# Patient Record
Sex: MALE | Race: OTHER | HISPANIC OR LATINO
[De-identification: names, ages, dates, MRNs, and addresses within clinical notes are randomized per-mention and may not be internally consistent; named-entity substitution may affect disease eponyms.]

---

## 2017-05-04 PROBLEM — Z87.898 HISTORY OF SYNCOPE: Status: RESOLVED | Noted: 2017-05-04 | Resolved: 2017-05-04

## 2017-05-06 ENCOUNTER — FORM ENCOUNTER (OUTPATIENT)
Age: 22
End: 2017-05-06

## 2017-05-07 ENCOUNTER — OUTPATIENT (OUTPATIENT)
Dept: OUTPATIENT SERVICES | Facility: HOSPITAL | Age: 22
LOS: 1 days | End: 2017-05-07
Payer: COMMERCIAL

## 2017-05-07 PROCEDURE — 70553 MRI BRAIN STEM W/O & W/DYE: CPT

## 2017-05-07 PROCEDURE — A9585: CPT

## 2017-05-07 PROCEDURE — 70553 MRI BRAIN STEM W/O & W/DYE: CPT | Mod: 26

## 2017-05-08 ENCOUNTER — APPOINTMENT (OUTPATIENT)
Dept: NEUROSURGERY | Facility: CLINIC | Age: 22
End: 2017-05-08

## 2017-05-08 VITALS
OXYGEN SATURATION: 98 % | DIASTOLIC BLOOD PRESSURE: 58 MMHG | HEART RATE: 74 BPM | WEIGHT: 172.31 LBS | HEIGHT: 71 IN | SYSTOLIC BLOOD PRESSURE: 128 MMHG | BODY MASS INDEX: 24.12 KG/M2

## 2017-05-08 DIAGNOSIS — Z78.9 OTHER SPECIFIED HEALTH STATUS: ICD-10-CM

## 2017-05-08 DIAGNOSIS — Z87.898 PERSONAL HISTORY OF OTHER SPECIFIED CONDITIONS: ICD-10-CM

## 2017-05-08 DIAGNOSIS — Z80.9 FAMILY HISTORY OF MALIGNANT NEOPLASM, UNSPECIFIED: ICD-10-CM

## 2017-05-10 ENCOUNTER — APPOINTMENT (OUTPATIENT)
Dept: OTOLARYNGOLOGY | Facility: CLINIC | Age: 22
End: 2017-05-10

## 2017-05-10 VITALS
DIASTOLIC BLOOD PRESSURE: 75 MMHG | SYSTOLIC BLOOD PRESSURE: 132 MMHG | TEMPERATURE: 98.2 F | HEART RATE: 68 BPM | OXYGEN SATURATION: 100 %

## 2017-05-10 PROBLEM — Z80.9 FAMILY HISTORY OF MALIGNANT NEOPLASM: Status: ACTIVE | Noted: 2017-05-08

## 2017-05-10 PROBLEM — Z78.9 DOES NOT USE ILLICIT DRUGS: Status: ACTIVE | Noted: 2017-05-08

## 2017-05-12 ENCOUNTER — APPOINTMENT (OUTPATIENT)
Dept: OTOLARYNGOLOGY | Facility: CLINIC | Age: 22
End: 2017-05-12

## 2017-05-12 ENCOUNTER — APPOINTMENT (OUTPATIENT)
Dept: OPHTHALMOLOGY | Facility: CLINIC | Age: 22
End: 2017-05-12

## 2017-05-12 DIAGNOSIS — R42 DIZZINESS AND GIDDINESS: ICD-10-CM

## 2017-05-18 ENCOUNTER — APPOINTMENT (OUTPATIENT)
Dept: OTOLARYNGOLOGY | Facility: CLINIC | Age: 22
End: 2017-05-18

## 2017-05-18 VITALS
DIASTOLIC BLOOD PRESSURE: 73 MMHG | HEART RATE: 99 BPM | SYSTOLIC BLOOD PRESSURE: 128 MMHG | TEMPERATURE: 97.5 F | OXYGEN SATURATION: 97 %

## 2017-12-15 ENCOUNTER — APPOINTMENT (OUTPATIENT)
Dept: OTOLARYNGOLOGY | Facility: CLINIC | Age: 22
End: 2017-12-15
Payer: COMMERCIAL

## 2017-12-15 VITALS
OXYGEN SATURATION: 98 % | HEART RATE: 75 BPM | TEMPERATURE: 97.6 F | DIASTOLIC BLOOD PRESSURE: 49 MMHG | SYSTOLIC BLOOD PRESSURE: 106 MMHG

## 2017-12-15 DIAGNOSIS — G93.0 CEREBRAL CYSTS: ICD-10-CM

## 2017-12-15 PROCEDURE — 99214 OFFICE O/P EST MOD 30 MIN: CPT

## 2017-12-15 PROCEDURE — 92557 COMPREHENSIVE HEARING TEST: CPT

## 2017-12-15 PROCEDURE — 92550 TYMPANOMETRY & REFLEX THRESH: CPT

## 2018-02-09 ENCOUNTER — APPOINTMENT (OUTPATIENT)
Dept: OTOLARYNGOLOGY | Facility: CLINIC | Age: 23
End: 2018-02-09
Payer: COMMERCIAL

## 2018-02-09 VITALS
OXYGEN SATURATION: 96 % | SYSTOLIC BLOOD PRESSURE: 124 MMHG | TEMPERATURE: 97.9 F | HEART RATE: 78 BPM | DIASTOLIC BLOOD PRESSURE: 69 MMHG

## 2018-02-09 PROCEDURE — 99024 POSTOP FOLLOW-UP VISIT: CPT

## 2018-10-23 ENCOUNTER — APPOINTMENT (OUTPATIENT)
Dept: OTOLARYNGOLOGY | Facility: CLINIC | Age: 23
End: 2018-10-23
Payer: COMMERCIAL

## 2018-10-23 VITALS
SYSTOLIC BLOOD PRESSURE: 124 MMHG | HEART RATE: 75 BPM | OXYGEN SATURATION: 99 % | TEMPERATURE: 98.3 F | DIASTOLIC BLOOD PRESSURE: 75 MMHG

## 2018-10-23 PROCEDURE — 69210 REMOVE IMPACTED EAR WAX UNI: CPT

## 2018-10-23 PROCEDURE — 99213 OFFICE O/P EST LOW 20 MIN: CPT | Mod: 25

## 2018-11-01 ENCOUNTER — APPOINTMENT (OUTPATIENT)
Dept: DERMATOLOGY | Facility: CLINIC | Age: 23
End: 2018-11-01

## 2018-11-08 ENCOUNTER — APPOINTMENT (OUTPATIENT)
Dept: DERMATOLOGY | Facility: CLINIC | Age: 23
End: 2018-11-08
Payer: COMMERCIAL

## 2018-11-08 VITALS — DIASTOLIC BLOOD PRESSURE: 77 MMHG | SYSTOLIC BLOOD PRESSURE: 115 MMHG

## 2018-11-08 DIAGNOSIS — R53.83 OTHER FATIGUE: ICD-10-CM

## 2018-11-08 DIAGNOSIS — L80 VITILIGO: ICD-10-CM

## 2018-11-08 PROCEDURE — 99203 OFFICE O/P NEW LOW 30 MIN: CPT

## 2018-11-08 PROCEDURE — 36415 COLL VENOUS BLD VENIPUNCTURE: CPT

## 2018-11-11 LAB
THYROGLOB AB SERPL-ACNC: <20 IU/ML
THYROPEROXIDASE AB SERPL IA-ACNC: 20 IU/ML
TSH SERPL-ACNC: 1.43 UIU/ML

## 2018-11-15 ENCOUNTER — APPOINTMENT (OUTPATIENT)
Dept: OTOLARYNGOLOGY | Facility: CLINIC | Age: 23
End: 2018-11-15

## 2018-12-06 ENCOUNTER — APPOINTMENT (OUTPATIENT)
Dept: DERMATOLOGY | Facility: CLINIC | Age: 23
End: 2018-12-06

## 2018-12-06 ENCOUNTER — APPOINTMENT (OUTPATIENT)
Dept: OTOLARYNGOLOGY | Facility: CLINIC | Age: 23
End: 2018-12-06

## 2019-02-07 ENCOUNTER — APPOINTMENT (OUTPATIENT)
Dept: DERMATOLOGY | Facility: CLINIC | Age: 24
End: 2019-02-07

## 2019-02-14 ENCOUNTER — APPOINTMENT (OUTPATIENT)
Dept: OTOLARYNGOLOGY | Facility: CLINIC | Age: 24
End: 2019-02-14

## 2019-03-21 ENCOUNTER — APPOINTMENT (OUTPATIENT)
Dept: DERMATOLOGY | Facility: CLINIC | Age: 24
End: 2019-03-21

## 2019-05-09 ENCOUNTER — APPOINTMENT (OUTPATIENT)
Dept: DERMATOLOGY | Facility: CLINIC | Age: 24
End: 2019-05-09

## 2019-11-06 ENCOUNTER — EMERGENCY (EMERGENCY)
Facility: HOSPITAL | Age: 24
LOS: 1 days | Discharge: HOME | End: 2019-11-06
Attending: EMERGENCY MEDICINE
Payer: COMMERCIAL

## 2019-11-06 VITALS
WEIGHT: 175.93 LBS | SYSTOLIC BLOOD PRESSURE: 137 MMHG | TEMPERATURE: 99 F | OXYGEN SATURATION: 97 % | RESPIRATION RATE: 16 BRPM | DIASTOLIC BLOOD PRESSURE: 71 MMHG | HEART RATE: 123 BPM | HEIGHT: 71 IN

## 2019-11-06 LAB
ALBUMIN SERPL ELPH-MCNC: 4.3 G/DL — SIGNIFICANT CHANGE UP (ref 3.5–5)
ALP SERPL-CCNC: 99 U/L — SIGNIFICANT CHANGE UP (ref 40–120)
ALT FLD-CCNC: 21 U/L DA — SIGNIFICANT CHANGE UP (ref 10–60)
ANION GAP SERPL CALC-SCNC: 8 MMOL/L — SIGNIFICANT CHANGE UP (ref 5–17)
APTT BLD: 32.8 SEC — SIGNIFICANT CHANGE UP (ref 27.5–36.3)
AST SERPL-CCNC: 13 U/L — SIGNIFICANT CHANGE UP (ref 10–40)
BASOPHILS # BLD AUTO: 0.06 K/UL — SIGNIFICANT CHANGE UP (ref 0–0.2)
BASOPHILS NFR BLD AUTO: 0.6 % — SIGNIFICANT CHANGE UP (ref 0–2)
BILIRUB SERPL-MCNC: 0.8 MG/DL — SIGNIFICANT CHANGE UP (ref 0.2–1.2)
BUN SERPL-MCNC: 18 MG/DL — SIGNIFICANT CHANGE UP (ref 7–18)
CALCIUM SERPL-MCNC: 8.6 MG/DL — SIGNIFICANT CHANGE UP (ref 8.4–10.5)
CHLORIDE SERPL-SCNC: 107 MMOL/L — SIGNIFICANT CHANGE UP (ref 96–108)
CO2 SERPL-SCNC: 24 MMOL/L — SIGNIFICANT CHANGE UP (ref 22–31)
CREAT SERPL-MCNC: 1.09 MG/DL — SIGNIFICANT CHANGE UP (ref 0.5–1.3)
EOSINOPHIL # BLD AUTO: 0.07 K/UL — SIGNIFICANT CHANGE UP (ref 0–0.5)
EOSINOPHIL NFR BLD AUTO: 0.7 % — SIGNIFICANT CHANGE UP (ref 0–6)
GLUCOSE SERPL-MCNC: 98 MG/DL — SIGNIFICANT CHANGE UP (ref 70–99)
HCG SERPL-ACNC: <1 MIU/ML — HIGH
HCT VFR BLD CALC: 41.4 % — SIGNIFICANT CHANGE UP (ref 39–50)
HGB BLD-MCNC: 14.2 G/DL — SIGNIFICANT CHANGE UP (ref 13–17)
IMM GRANULOCYTES NFR BLD AUTO: 0.3 % — SIGNIFICANT CHANGE UP (ref 0–1.5)
INR BLD: 1.18 RATIO — SIGNIFICANT CHANGE UP (ref 0.88–1.16)
LACTATE SERPL-SCNC: 1 MMOL/L — SIGNIFICANT CHANGE UP (ref 0.7–2)
LYMPHOCYTES # BLD AUTO: 1.59 K/UL — SIGNIFICANT CHANGE UP (ref 1–3.3)
LYMPHOCYTES # BLD AUTO: 14.8 % — SIGNIFICANT CHANGE UP (ref 13–44)
MCHC RBC-ENTMCNC: 30.7 PG — SIGNIFICANT CHANGE UP (ref 27–34)
MCHC RBC-ENTMCNC: 34.3 GM/DL — SIGNIFICANT CHANGE UP (ref 32–36)
MCV RBC AUTO: 89.6 FL — SIGNIFICANT CHANGE UP (ref 80–100)
MONOCYTES # BLD AUTO: 1.04 K/UL — HIGH (ref 0–0.9)
MONOCYTES NFR BLD AUTO: 9.7 % — SIGNIFICANT CHANGE UP (ref 2–14)
NEUTROPHILS # BLD AUTO: 7.92 K/UL — HIGH (ref 1.8–7.4)
NEUTROPHILS NFR BLD AUTO: 73.9 % — SIGNIFICANT CHANGE UP (ref 43–77)
NRBC # BLD: 0 /100 WBCS — SIGNIFICANT CHANGE UP (ref 0–0)
PLATELET # BLD AUTO: 296 K/UL — SIGNIFICANT CHANGE UP (ref 150–400)
POTASSIUM SERPL-MCNC: 4 MMOL/L — SIGNIFICANT CHANGE UP (ref 3.5–5.3)
POTASSIUM SERPL-SCNC: 4 MMOL/L — SIGNIFICANT CHANGE UP (ref 3.5–5.3)
PROT SERPL-MCNC: 7.2 G/DL — SIGNIFICANT CHANGE UP (ref 6–8.3)
PROTHROM AB SERPL-ACNC: 13.2 SEC — SIGNIFICANT CHANGE UP (ref 10–12.9)
RBC # BLD: 4.62 M/UL — SIGNIFICANT CHANGE UP (ref 4.2–5.8)
RBC # FLD: 12.9 % — SIGNIFICANT CHANGE UP (ref 10.3–14.5)
SODIUM SERPL-SCNC: 139 MMOL/L — SIGNIFICANT CHANGE UP (ref 135–145)
WBC # BLD: 10.71 K/UL — HIGH (ref 3.8–10.5)
WBC # FLD AUTO: 10.71 K/UL — HIGH (ref 3.8–10.5)

## 2019-11-06 PROCEDURE — 99285 EMERGENCY DEPT VISIT HI MDM: CPT

## 2019-11-06 RX ORDER — IOHEXOL 300 MG/ML
30 INJECTION, SOLUTION INTRAVENOUS ONCE
Refills: 0 | Status: COMPLETED | OUTPATIENT
Start: 2019-11-06 | End: 2019-11-07

## 2019-11-06 RX ORDER — ACETAMINOPHEN 500 MG
1000 TABLET ORAL ONCE
Refills: 0 | Status: COMPLETED | OUTPATIENT
Start: 2019-11-06 | End: 2019-11-06

## 2019-11-06 RX ORDER — SODIUM CHLORIDE 9 MG/ML
1000 INJECTION INTRAMUSCULAR; INTRAVENOUS; SUBCUTANEOUS ONCE
Refills: 0 | Status: COMPLETED | OUTPATIENT
Start: 2019-11-06 | End: 2019-11-06

## 2019-11-06 RX ORDER — MORPHINE SULFATE 50 MG/1
4 CAPSULE, EXTENDED RELEASE ORAL ONCE
Refills: 0 | Status: DISCONTINUED | OUTPATIENT
Start: 2019-11-06 | End: 2019-11-06

## 2019-11-06 RX ADMIN — Medication 400 MILLIGRAM(S): at 23:21

## 2019-11-06 RX ADMIN — Medication 1000 MILLIGRAM(S): at 23:48

## 2019-11-06 RX ADMIN — SODIUM CHLORIDE 1000 MILLILITER(S): 9 INJECTION INTRAMUSCULAR; INTRAVENOUS; SUBCUTANEOUS at 23:21

## 2019-11-07 LAB
APPEARANCE UR: CLEAR — SIGNIFICANT CHANGE UP
BILIRUB UR-MCNC: NEGATIVE — SIGNIFICANT CHANGE UP
COLOR SPEC: YELLOW — SIGNIFICANT CHANGE UP
DIFF PNL FLD: ABNORMAL
GLUCOSE UR QL: NEGATIVE — SIGNIFICANT CHANGE UP
KETONES UR-MCNC: ABNORMAL
LEUKOCYTE ESTERASE UR-ACNC: NEGATIVE — SIGNIFICANT CHANGE UP
NITRITE UR-MCNC: NEGATIVE — SIGNIFICANT CHANGE UP
PH UR: 5 — SIGNIFICANT CHANGE UP (ref 5–8)
PROT UR-MCNC: NEGATIVE — SIGNIFICANT CHANGE UP
SP GR SPEC: 1.01 — SIGNIFICANT CHANGE UP (ref 1.01–1.02)
UROBILINOGEN FLD QL: NEGATIVE — SIGNIFICANT CHANGE UP

## 2019-11-07 PROCEDURE — 83605 ASSAY OF LACTIC ACID: CPT

## 2019-11-07 PROCEDURE — 74177 CT ABD & PELVIS W/CONTRAST: CPT | Mod: 26

## 2019-11-07 PROCEDURE — 85027 COMPLETE CBC AUTOMATED: CPT

## 2019-11-07 PROCEDURE — 87086 URINE CULTURE/COLONY COUNT: CPT

## 2019-11-07 PROCEDURE — 36415 COLL VENOUS BLD VENIPUNCTURE: CPT

## 2019-11-07 PROCEDURE — 99284 EMERGENCY DEPT VISIT MOD MDM: CPT | Mod: 25

## 2019-11-07 PROCEDURE — 85610 PROTHROMBIN TIME: CPT

## 2019-11-07 PROCEDURE — 96375 TX/PRO/DX INJ NEW DRUG ADDON: CPT

## 2019-11-07 PROCEDURE — 81001 URINALYSIS AUTO W/SCOPE: CPT

## 2019-11-07 PROCEDURE — 86900 BLOOD TYPING SEROLOGIC ABO: CPT

## 2019-11-07 PROCEDURE — 86901 BLOOD TYPING SEROLOGIC RH(D): CPT

## 2019-11-07 PROCEDURE — 85730 THROMBOPLASTIN TIME PARTIAL: CPT

## 2019-11-07 PROCEDURE — 84702 CHORIONIC GONADOTROPIN TEST: CPT

## 2019-11-07 PROCEDURE — 86850 RBC ANTIBODY SCREEN: CPT

## 2019-11-07 PROCEDURE — 74177 CT ABD & PELVIS W/CONTRAST: CPT

## 2019-11-07 PROCEDURE — 80053 COMPREHEN METABOLIC PANEL: CPT

## 2019-11-07 PROCEDURE — 96365 THER/PROPH/DIAG IV INF INIT: CPT | Mod: XU

## 2019-11-07 RX ORDER — FAMOTIDINE 10 MG/ML
20 INJECTION INTRAVENOUS ONCE
Refills: 0 | Status: COMPLETED | OUTPATIENT
Start: 2019-11-07 | End: 2019-11-07

## 2019-11-07 RX ORDER — PANTOPRAZOLE SODIUM 20 MG/1
1 TABLET, DELAYED RELEASE ORAL
Qty: 14 | Refills: 0
Start: 2019-11-07 | End: 2019-11-20

## 2019-11-07 RX ORDER — LIDOCAINE 4 G/100G
10 CREAM TOPICAL ONCE
Refills: 0 | Status: COMPLETED | OUTPATIENT
Start: 2019-11-07 | End: 2019-11-07

## 2019-11-07 RX ADMIN — MORPHINE SULFATE 4 MILLIGRAM(S): 50 CAPSULE, EXTENDED RELEASE ORAL at 00:10

## 2019-11-07 RX ADMIN — IOHEXOL 30 MILLILITER(S): 300 INJECTION, SOLUTION INTRAVENOUS at 00:19

## 2019-11-07 RX ADMIN — FAMOTIDINE 20 MILLIGRAM(S): 10 INJECTION INTRAVENOUS at 02:46

## 2019-11-07 RX ADMIN — SODIUM CHLORIDE 1000 MILLILITER(S): 9 INJECTION INTRAMUSCULAR; INTRAVENOUS; SUBCUTANEOUS at 00:15

## 2019-11-07 RX ADMIN — LIDOCAINE 10 MILLILITER(S): 4 CREAM TOPICAL at 02:46

## 2019-11-07 RX ADMIN — Medication 1000 MILLIGRAM(S): at 00:20

## 2019-11-07 RX ADMIN — MORPHINE SULFATE 4 MILLIGRAM(S): 50 CAPSULE, EXTENDED RELEASE ORAL at 00:15

## 2019-11-07 RX ADMIN — Medication 30 MILLILITER(S): at 02:46

## 2019-11-07 NOTE — ED PROVIDER NOTE - OBJECTIVE STATEMENT
25 y/o M with PMHx of GERD and possible hernia who he regularly follows up with a GI with frequency endoscopies presents to ED complaining of upper abdominal pain that turned into generalized abdominal pain x a few days but worsened tenderness. Pt states pain is associated with nausea but patient did not vomit. Pt states this feels different than his GERD symptoms because this is more painful than burning. Pt denies previous abdominal surgeries. Pt denies fever, back pain, diarrhea, constipation, urinary complaints, or any other complaints. Pt allergic to Sulfa drugs.

## 2019-11-07 NOTE — ED PROVIDER NOTE - PATIENT PORTAL LINK FT
You can access the FollowMyHealth Patient Portal offered by [facility] by registering at the following website: [email or web address]. By joining [facility]’s FollowMyHealth portal, you will also be able to view your health information using other applications (apps) compatible with our system.

## 2019-11-07 NOTE — ED PROVIDER NOTE - CLINICAL SUMMARY MEDICAL DECISION MAKING FREE TEXT BOX
24 year old male with abd pain. tachycardic. PE as above.  labs and UA unremarkable. ct abdomen with PO/IV contrast unremarkable. given GI cocktail and NS bolus. feels improved. likely gastritis. has f/u with GI in 2 days. will dc with PPI. f/u GI. return precautions given.

## 2019-11-08 LAB
CULTURE RESULTS: SIGNIFICANT CHANGE UP
SPECIMEN SOURCE: SIGNIFICANT CHANGE UP

## 2020-03-13 PROBLEM — K46.9 UNSPECIFIED ABDOMINAL HERNIA WITHOUT OBSTRUCTION OR GANGRENE: Chronic | Status: ACTIVE | Noted: 2019-11-07

## 2020-03-13 PROBLEM — K21.9 GASTRO-ESOPHAGEAL REFLUX DISEASE WITHOUT ESOPHAGITIS: Chronic | Status: ACTIVE | Noted: 2019-11-07

## 2020-03-16 ENCOUNTER — APPOINTMENT (OUTPATIENT)
Dept: OTOLARYNGOLOGY | Facility: CLINIC | Age: 25
End: 2020-03-16

## 2020-04-08 ENCOUNTER — APPOINTMENT (OUTPATIENT)
Dept: DERMATOLOGY | Facility: CLINIC | Age: 25
End: 2020-04-08

## 2020-04-20 ENCOUNTER — APPOINTMENT (OUTPATIENT)
Dept: UROLOGY | Facility: CLINIC | Age: 25
End: 2020-04-20
Payer: COMMERCIAL

## 2020-04-20 DIAGNOSIS — Z78.9 OTHER SPECIFIED HEALTH STATUS: ICD-10-CM

## 2020-04-20 DIAGNOSIS — R51 HEADACHE: ICD-10-CM

## 2020-04-20 DIAGNOSIS — Z80.3 FAMILY HISTORY OF MALIGNANT NEOPLASM OF BREAST: ICD-10-CM

## 2020-04-20 DIAGNOSIS — H93.13 TINNITUS, BILATERAL: ICD-10-CM

## 2020-04-20 PROCEDURE — 99204 OFFICE O/P NEW MOD 45 MIN: CPT | Mod: 95

## 2020-04-20 RX ORDER — ERYTHROMYCIN 250 MG/1
250 TABLET, FILM COATED ORAL
Qty: 24 | Refills: 0 | Status: DISCONTINUED | COMMUNITY
Start: 2017-11-10 | End: 2020-04-20

## 2020-04-20 RX ORDER — SCOPALAMINE 1 MG/3D
1 PATCH, EXTENDED RELEASE TRANSDERMAL
Qty: 10 | Refills: 0 | Status: DISCONTINUED | COMMUNITY
Start: 2017-06-22 | End: 2020-04-20

## 2020-04-20 RX ORDER — TACROLIMUS 1 MG/G
0.1 OINTMENT TOPICAL
Qty: 1 | Refills: 0 | Status: DISCONTINUED | COMMUNITY
Start: 2018-11-08 | End: 2020-04-20

## 2020-04-20 RX ORDER — ALBUTEROL 90 MCG
AEROSOL (GRAM) INHALATION
Refills: 0 | Status: ACTIVE | COMMUNITY

## 2020-04-20 RX ORDER — MOMETASONE FUROATE 1 MG/G
0.1 CREAM TOPICAL
Qty: 1 | Refills: 3 | Status: DISCONTINUED | COMMUNITY
Start: 2018-10-23 | End: 2020-04-20

## 2020-04-20 RX ORDER — HALOBETASOL PROPIONATE 0.5 MG/G
0.05 CREAM TOPICAL
Qty: 1 | Refills: 0 | Status: DISCONTINUED | COMMUNITY
Start: 2018-11-08 | End: 2020-04-20

## 2020-04-20 NOTE — PHYSICAL EXAM
[General Appearance - Well Developed] : well developed [Normal Appearance] : normal appearance [General Appearance - Well Nourished] : well nourished [Well Groomed] : well groomed [General Appearance - In No Acute Distress] : no acute distress [Oriented To Time, Place, And Person] : oriented to person, place, and time [Affect] : the affect was normal [Mood] : the mood was normal [Not Anxious] : not anxious

## 2020-04-20 NOTE — ASSESSMENT
[FreeTextEntry1] : I discussed the findings and options with Mr. NIKO MIMS in detail.  We will obtain a stone protocol CT scan, urinalysis and urine culture (requisitions to be emailed to patient).  I would like to see him in the office when it is safe to schedule an appointment. \par

## 2020-04-20 NOTE — HISTORY OF PRESENT ILLNESS
[FreeTextEntry1] : I contacted Mr. NIKO YAÑEZ by telemedicine using the [employer] platform.  The appropriate consent was obtained prior to the conference.  The primary purpose of the meeting was to discuss his genitourinary symptoms.\par \par Several days ago, after showering, he experienced left abdominal pain radiating to the ipsilateral scrotum, which recurs every hour and last for "seconds".   It is described as sharp, intermittent and is associated with dysuria; he denies hematuria or urethral discharge. There is no history of urolithiasis or trauma. \par \par Mr. Yañez reports a "black spot" noted on the distal penile shaft. This has been asymptomatic and has decreased in size.  \par \par His erectile function is normal, but noted a left penile curve "three days ago".  He has not been sexually active for 4 months.

## 2020-04-20 NOTE — LETTER BODY
[Dear  ___] : Dear  [unfilled], [Consult Letter:] : I had the pleasure of evaluating your patient, [unfilled]. [Please see my note below.] : Please see my note below. [Consult Closing:] : Thank you very much for allowing me to participate in the care of this patient.  If you have any questions, please do not hesitate to contact me. [Sincerely,] : Sincerely, [FreeTextEntry3] : Jack Jimenes MD, FACS\par

## 2020-05-14 ENCOUNTER — APPOINTMENT (OUTPATIENT)
Dept: UROLOGY | Facility: CLINIC | Age: 25
End: 2020-05-14
Payer: COMMERCIAL

## 2020-05-14 VITALS
SYSTOLIC BLOOD PRESSURE: 120 MMHG | TEMPERATURE: 98.7 F | WEIGHT: 181 LBS | HEIGHT: 71 IN | BODY MASS INDEX: 25.34 KG/M2 | DIASTOLIC BLOOD PRESSURE: 72 MMHG | OXYGEN SATURATION: 97 %

## 2020-05-14 DIAGNOSIS — N48.89 OTHER SPECIFIED DISORDERS OF PENIS: ICD-10-CM

## 2020-05-14 DIAGNOSIS — N48.9 DISORDER OF PENIS, UNSPECIFIED: ICD-10-CM

## 2020-05-14 DIAGNOSIS — Z87.898 PERSONAL HISTORY OF OTHER SPECIFIED CONDITIONS: ICD-10-CM

## 2020-05-14 DIAGNOSIS — R10.9 UNSPECIFIED ABDOMINAL PAIN: ICD-10-CM

## 2020-05-14 DIAGNOSIS — R35.0 FREQUENCY OF MICTURITION: ICD-10-CM

## 2020-05-14 DIAGNOSIS — Z00.00 ENCOUNTER FOR GENERAL ADULT MEDICAL EXAMINATION W/OUT ABNORMAL FINDINGS: ICD-10-CM

## 2020-05-14 PROCEDURE — 81003 URINALYSIS AUTO W/O SCOPE: CPT | Mod: QW

## 2020-05-14 PROCEDURE — 99215 OFFICE O/P EST HI 40 MIN: CPT

## 2020-05-14 NOTE — LETTER BODY
[Consult Letter:] : I had the pleasure of evaluating your patient, [unfilled]. [Please see my note below.] : Please see my note below. [Consult Closing:] : Thank you very much for allowing me to participate in the care of this patient.  If you have any questions, please do not hesitate to contact me. [Sincerely,] : Sincerely, [Dear  ___] : Dear  [unfilled], [FreeTextEntry3] : Jack Jimenes MD, FACS\par

## 2020-05-14 NOTE — HISTORY OF PRESENT ILLNESS
[FreeTextEntry1] : Mr. NIKO YAÑEZ comes in today for a urologic follow-up.  The abdominal pain has resolved but he still has mild right groin pain, which is described as intermittent, localized and positional.  He also has mild testicular "sensitivity" on palpation alone. The prior dysuria has resolved.\par \par From his general urologic history, Mr. Yañez reports negligible LUTS.  He denies a history of urolithiasis or trauma. His erectile function is normal, but approximately one month ago he noted a left penile curve (at the base, suggesting more of a left lean rather than a curve). Initially this was associated with penile pain, which has resolved.  He has not been sexually active for 4 months. \par \par Mr. Yañez reports a "black spot" on the distal penile shaft. This has been asymptomatic and has decreased in size.  \par \par CT abd/pelvis:  4/24/00--Normal study\par UA and c+s:  4/23/20--Negative and no growth\par

## 2020-05-14 NOTE — ASSESSMENT
[FreeTextEntry1] : I discussed the findings and options with Mr. NIKO MIMS in detail.  I reviewed the CT and urine test results with him and provided him with a copy of these.\par \par In the absence of any abnormal objective findings the likely cause for his persistent mild right inguinal pain is neuromuscular.  I advised that he consider using NSAIDs sparingly as a short-term solution.  Finally I instructed him on monthly testicular self examinations.\par

## 2020-05-14 NOTE — PHYSICAL EXAM
[General Appearance - Well Developed] : well developed [General Appearance - Well Nourished] : well nourished [Normal Appearance] : normal appearance [Well Groomed] : well groomed [General Appearance - In No Acute Distress] : no acute distress [Oriented To Time, Place, And Person] : oriented to person, place, and time [Affect] : the affect was normal [Mood] : the mood was normal [Not Anxious] : not anxious [Abdomen Soft] : soft [Abdomen Mass (___ Cm)] : no abdominal mass palpated [Abdomen Tenderness] : non-tender [Abdomen Hernia] : no hernia was discovered [Costovertebral Angle Tenderness] : no ~M costovertebral angle tenderness [Penis Abnormality] : normal circumcised penis [Urethral Meatus] : meatus normal [Scrotum] : the scrotum was normal [Urinary Bladder Findings] : the bladder was normal on palpation [Epididymis] : the epididymides were normal [Testes Tenderness] : no tenderness of the testes [Testes Mass (___cm)] : there were no testicular masses [No Prostate Nodules] : no prostate nodules [Skin Color & Pigmentation] : normal skin color and pigmentation [Heart Rate And Rhythm] : Heart rate and rhythm were normal [Edema] : no peripheral edema [] : no respiratory distress [Exaggerated Use Of Accessory Muscles For Inspiration] : no accessory muscle use [Respiration, Rhythm And Depth] : normal respiratory rhythm and effort [Normal Station and Gait] : the gait and station were normal for the patient's age [No Focal Deficits] : no focal deficits [Sensation] : the sensory exam was normal to light touch and pinprick [No Palpable Adenopathy] : no palpable adenopathy [FreeTextEntry1] : No penile lesions or plaques

## 2020-05-15 LAB
BILIRUB UR QL STRIP: NORMAL
CLARITY UR: CLEAR
COLLECTION METHOD: NORMAL
GLUCOSE UR-MCNC: NORMAL
HCG UR QL: 0.2 EU/DL
HGB UR QL STRIP.AUTO: NORMAL
KETONES UR-MCNC: NORMAL
LEUKOCYTE ESTERASE UR QL STRIP: NORMAL
NITRITE UR QL STRIP: NORMAL
PH UR STRIP: 5.5
PROT UR STRIP-MCNC: NORMAL
SP GR UR STRIP: >=1.03

## 2021-02-16 ENCOUNTER — APPOINTMENT (OUTPATIENT)
Dept: OTOLARYNGOLOGY | Facility: CLINIC | Age: 26
End: 2021-02-16

## 2021-02-19 ENCOUNTER — APPOINTMENT (OUTPATIENT)
Dept: OTOLARYNGOLOGY | Facility: CLINIC | Age: 26
End: 2021-02-19

## 2021-08-10 ENCOUNTER — APPOINTMENT (OUTPATIENT)
Dept: OTOLARYNGOLOGY | Facility: CLINIC | Age: 26
End: 2021-08-10

## 2023-04-27 ENCOUNTER — APPOINTMENT (OUTPATIENT)
Dept: OTOLARYNGOLOGY | Facility: CLINIC | Age: 28
End: 2023-04-27
Payer: MEDICAID

## 2023-04-27 DIAGNOSIS — M26.629 ARTHRALGIA OF TEMPOROMANDIBULAR JOINT,: ICD-10-CM

## 2023-04-27 DIAGNOSIS — L29.9 PRURITUS, UNSPECIFIED: ICD-10-CM

## 2023-04-27 PROCEDURE — 99204 OFFICE O/P NEW MOD 45 MIN: CPT

## 2023-04-27 NOTE — HISTORY OF PRESENT ILLNESS
[de-identified] : CC: TMJD\par \par HISTORY OF PRESENT ILLNESS: Mr. Yañez is a pleasant 28 year old gentleman with TMJD\par with chronic ear itchiness, with pain or drainage\par he does have clicking from his jaws but denies bruxism or clenching. he had deep clearning of his teeth recently and he felt very fatigued. \par \par \par REVIEW OF SYSTEMS: \par General ROS: negative for - chills, fatigue or fever\par Psychological ROS: negative for - anxiety or depression\par Ophthalmic ROS: negative for - blurry vision, decreased vision or double vision \par ENT ROS: negative except as noted from HPI\par Allergy and Immunology ROS: negative except as noted from HPI\par Hematological and Lymphatic ROS: negative for - bleeding problems \par Endocrine ROS: negative for - malaise/lethargy\par Respiratory ROS: negative for - stridor\par Cardiovascular ROS: negative for - chest pain\par Gastrointestinal ROS: negative for - appetite loss or nausea/vomiting\par Genitourinary ROS: negative for - incontinence\par Musculoskeletal ROS: negative for - gait disturbance \par Neurological ROS: negative for - behavioral changes\par Dermatological ROS: negative for - nail changes\par \par Physical Exam:\par \par GENERAL APPEARANCE: Well-developed and No Acute Distress.\par COMMUNICATION: Able to Communicate. Strong Voice.\par \par HEAD AND FACE\par Eyes: Testing of ocular motility including primary gaze alignment normal.\par Inspection and Appearance: No evidence of lesions or masses\par Palpation: Palpation of the face reveals no sinus tenderness\par Salivary Glands: Symmetric without masses\par Facial Strength: Symmetric without evidence of facial paralysis\par \par EAR, NOSE, MOUTH, and THROAT:\par Ear Canals and Tympanic Membranes, Bilateral: No evidence of inflammation or lesions. AU EAC clear TMI\par Thresholds: Clinical speech reception thresholds normal.\par External, Nose and Auricle: No lesions or masses.\par \par NECK:\par Evaluation: No evidence of masses or crepitus. The neck is symmetric and the trachea is in the midline position.\par Thyroid: No evidence of enlargement, tenderness or mass.\par Neck Lymph Nodes: WNL. some TMJ tenderness\par Respiratory: Inspection of the chest including symmetry, expansion and/or assessment of respiratory effort normal.\par Cardiovascular: Evaluation of peripheral vascular system by observation and palpation of capillary refill, normal.\par Neurological/Psychiatric: Alert, Oriented, Mood, and Affect Normal.\par \par IMPRESSION: Mr. Yañez is a pleasant 28 year old gentleman with significant aural pruritis, TMJD\par \par PLAN:\par -TMJ referral\par -mineral oil PRN\par \par \par MD ANKUSH Whitten\par Rhinology and Skull Base Surgery\par Department of Otolaryngology- Head and Neck Surgery\par [facility]\par [facility]\par

## 2023-07-12 ENCOUNTER — APPOINTMENT (OUTPATIENT)
Dept: UROLOGY | Facility: CLINIC | Age: 28
End: 2023-07-12
Payer: MEDICAID

## 2023-07-12 VITALS
DIASTOLIC BLOOD PRESSURE: 68 MMHG | OXYGEN SATURATION: 98 % | TEMPERATURE: 98.3 F | SYSTOLIC BLOOD PRESSURE: 122 MMHG | HEART RATE: 80 BPM

## 2023-07-12 PROCEDURE — 99204 OFFICE O/P NEW MOD 45 MIN: CPT

## 2023-07-12 NOTE — HISTORY OF PRESENT ILLNESS
[FreeTextEntry1] : Name NIKO MIMS \par MRN [id number] \par  Mar  4 1995 \par ------------------------------------------------------------------------------------------------------------------------------------------- \par Date of First Visit: 23\par Referring Provider/PCP: ***** / Bernardo Clayton\par ------------------------------------------------------------------------------------------------------------------------------------------- \par CC: left flank pain, lower urinary tract symptoms (LUTS)\par \par History of Present Illness: NIKO MIMS is a 28 year male who presents for evaluation of flank and pelvic pain. He states he had burning pain while urinating and passed two small objects (unsure of stones or soft debris) a month ago, and had a repeat episode of the same two weeks later with sharp pain in the left lower abdomen prompting him to go to [facility] ED. His CT imaging at [facility] was completely negative with no evidence of hydroureteronephrosis or calculi bilaterally. UA showed trace LE with some microhematuria and mild to equivocal pyuria. He was discharged from [facility] ED with Vantin and asked to follow up with a urologist. He presents for outpatient evaluation for his pain. He denies fevers, chills, nausea, vomiting. Denies gross hematuria, dysuria, or incomplete emptying.  Occasionally has a sensation of weakened stream as well as pain/discomfort at the tip of the penis.\par

## 2023-07-12 NOTE — PHYSICAL EXAM
[General Appearance - Well Developed] : well developed [General Appearance - Well Nourished] : well nourished [Normal Appearance] : normal appearance [Well Groomed] : well groomed [Heart Rate And Rhythm] : Heart rate and rhythm were normal [Edema] : no peripheral edema [] : no respiratory distress [Respiration, Rhythm And Depth] : normal respiratory rhythm and effort [Exaggerated Use Of Accessory Muscles For Inspiration] : no accessory muscle use [Abdomen Soft] : soft [Abdomen Tenderness] : non-tender [Urethral Meatus] : meatus normal [Penis Abnormality] : normal circumcised penis [Scrotum] : the scrotum was normal [Testes Tenderness] : no tenderness of the testes [Testes Mass (___cm)] : there were no testicular masses [Normal Station and Gait] : the gait and station were normal for the patient's age [Oriented To Time, Place, And Person] : oriented to person, place, and time [Affect] : the affect was normal [Not Anxious] : not anxious

## 2023-07-12 NOTE — ASSESSMENT
[FreeTextEntry1] : Assessment: Patient is a 28M with complaints of bilateral flank and lower left abdominal pain and episodic LUTS. Unremarkable imaging and labs from [facility] ED showing no evidence of calculi or hydronephrosis bilaterally.  Symptoms are suggestive of chronic noninfectious/inflammatory prostatitis. \par \par \par Plan:\par - Antiinflammatory agents (NSAIDs) as needed for symptoms\par - UA UCx today\par - Counseling on dietary changes -avoid urinary irritants such as caffeine, spicy foods, alcohol, tobacco products\par - RTC 3 months for symptom check

## 2023-07-17 ENCOUNTER — NON-APPOINTMENT (OUTPATIENT)
Age: 28
End: 2023-07-17

## 2023-07-17 LAB
APPEARANCE: CLEAR
BACTERIA UR CULT: NORMAL
BACTERIA: NEGATIVE /HPF
BILIRUBIN URINE: NEGATIVE
BLOOD URINE: NEGATIVE
CALCIUM OXALATE CRYSTALS: PRESENT
CAST: 0 /LPF
COLOR: NORMAL
EPITHELIAL CELLS: 0 /HPF
GLUCOSE QUALITATIVE U: NEGATIVE MG/DL
KETONES URINE: ABNORMAL MG/DL
LEUKOCYTE ESTERASE URINE: NEGATIVE
MICROSCOPIC-UA: NORMAL
MUCUS: PRESENT
NITRITE URINE: NEGATIVE
PH URINE: 5.5
PROTEIN URINE: NEGATIVE MG/DL
RED BLOOD CELLS URINE: 3 /HPF
REVIEW: NORMAL
SPECIFIC GRAVITY URINE: 1.03
UROBILINOGEN URINE: 0.2 MG/DL
WHITE BLOOD CELLS URINE: 0 /HPF

## 2023-08-14 ENCOUNTER — APPOINTMENT (OUTPATIENT)
Dept: ORTHOPEDIC SURGERY | Facility: CLINIC | Age: 28
End: 2023-08-14
Payer: MEDICAID

## 2023-08-14 VITALS
SYSTOLIC BLOOD PRESSURE: 146 MMHG | DIASTOLIC BLOOD PRESSURE: 55 MMHG | OXYGEN SATURATION: 97 % | BODY MASS INDEX: 24.64 KG/M2 | HEIGHT: 71 IN | WEIGHT: 176 LBS | HEART RATE: 80 BPM

## 2023-08-14 DIAGNOSIS — M54.2 CERVICALGIA: ICD-10-CM

## 2023-08-14 PROCEDURE — 73030 X-RAY EXAM OF SHOULDER: CPT | Mod: RT

## 2023-08-14 PROCEDURE — 20610 DRAIN/INJ JOINT/BURSA W/O US: CPT | Mod: RT

## 2023-08-14 PROCEDURE — 99203 OFFICE O/P NEW LOW 30 MIN: CPT | Mod: 25

## 2023-08-14 NOTE — HISTORY OF PRESENT ILLNESS
Hypertension is improving with treatment.  Continue current treatment regimen.  Dietary sodium restriction.  Weight loss.  Blood pressure will be reassessed at the next regular appointment.  
Lipid abnormalities are improving with treatment.  Nutritional counseling was provided. and Pharmacotherapy as ordered.  Lipids will be reassessed in 3 months.  
[de-identified] : NIKO MIMS is a 28 year old RHD M patient that presents today with right shoulder and neck pain. Pt states pain started 2 months ago after exercising he felt a sharp pain in his right shoulder which didn't go away but comes and goes. Pt states the pain got progressively worse overt the weeks and is constant now.The pain is over the AC joint and periscapular. No loss of motion.  Pt states his shoulder cracks a lot when he moves it's in certain position and he started to experience neck pain two weeks ago.

## 2023-08-14 NOTE — PHYSICAL EXAM
[de-identified] : C spine full AROM PROM some pain on hyperextension Neuro exam intact right UE Right shoulder full AROM tender over AC joint positive Neer and cross arm imp sign neg Mccarthy  SS IS subscap 5/5 to isometric testing despite pain with test at AC joint.   [de-identified] : 4 views of right shoulder done today WNL. NO AC narrowing

## 2023-08-14 NOTE — PROCEDURE
[de-identified] : The right shoulder was prepped with alchohol and ethyl chloride was used to numb the skin. A 3 cc injection with 1 cc xylocaine, 1cc sensoricaine and 1 cc depomedrol , was given without complication into the AC joint. Patient instructed that there may be an inflammatory flare for 24 hrs , to use ice or advil if needed

## 2023-08-14 NOTE — DISCUSSION/SUMMARY
[de-identified] : 28 year old avide weight  with several month onset AC joint and shoulder ache. Dx: I believe is weight 's shoulder at AC joint however also has some mild impingement and cervicalgia but no significant radiculopathy PLan  AC jt injection  Avoid weight lifting for 6 weeks  PT for RC and scapular stabilization  f/u 6 weeks

## 2023-09-08 ENCOUNTER — APPOINTMENT (OUTPATIENT)
Dept: UROLOGY | Facility: CLINIC | Age: 28
End: 2023-09-08
Payer: MEDICAID

## 2023-09-08 VITALS
BODY MASS INDEX: 23.52 KG/M2 | DIASTOLIC BLOOD PRESSURE: 68 MMHG | OXYGEN SATURATION: 99 % | TEMPERATURE: 98 F | HEIGHT: 71 IN | WEIGHT: 168 LBS | HEART RATE: 69 BPM | SYSTOLIC BLOOD PRESSURE: 116 MMHG

## 2023-09-08 DIAGNOSIS — M62.89 OTHER SPECIFIED DISORDERS OF MUSCLE: ICD-10-CM

## 2023-09-08 PROCEDURE — 99214 OFFICE O/P EST MOD 30 MIN: CPT

## 2023-09-08 RX ORDER — OXYBUTYNIN CHLORIDE 10 MG/1
10 TABLET, EXTENDED RELEASE ORAL
Qty: 30 | Refills: 2 | Status: ACTIVE | COMMUNITY
Start: 2023-09-08 | End: 1900-01-01

## 2023-09-08 NOTE — HISTORY OF PRESENT ILLNESS
[FreeTextEntry1] : Name NIKO MIMS MRN [id number]  Mar 4 1995 ------------------------------------------------------------------------------------------------------------------------------------------- Date of First Visit: 23 Referring Provider/PCP: ***** / Bernardo Clayton ------------------------------------------------------------------------------------------------------------------------------------------- CC: left flank pain, lower urinary tract symptoms (LUTS)  History of Present Illness: NIKO MIMS is a 28 year male who presents for evaluation of flank and pelvic pain. He states he had burning pain while urinating and passed two small objects (unsure of stones or soft debris) a month ago, and had a repeat episode of the same two weeks later with sharp pain in the left lower abdomen prompting him to go to [facility] ED. His CT imaging at [facility] was completely negative with no evidence of hydroureteronephrosis or calculi bilaterally. UA showed trace LE with some microhematuria and mild to equivocal pyuria. He was discharged from [facility] ED with Vantin and asked to follow up with a urologist. He presents for outpatient evaluation for his pain. He denies fevers, chills, nausea, vomiting. Denies gross hematuria, dysuria, or incomplete emptying. Occasionally has a sensation of weakened stream as well as pain/discomfort at the tip of the penis. -------------------------------------------------------------------------------------------------------------------------------------------  Interval History (2023):  Here for follow up for flank and pelvic pain. Still experiencing left flank pain that radiates to the abdomen and penis. Symptoms are mildly improved with NSAIDS, but he is concerned about taking them long term. Also still bothered by urinary frequency, urgency, nocturia 3-4x/night. Has been avoiding bladder irritants with mild improvement in symptoms. Has new dysuria over the past 10 days.  UA at last visit with 3 RBC. UCx negative

## 2023-09-08 NOTE — ASSESSMENT
[FreeTextEntry1] : Assessment: Patient is a 28M with complaints of bilateral flank and lower left abdominal pain and episodic LUTS. Unremarkable imaging and labs from [facility] ED showing no evidence of calculi or hydronephrosis bilaterally. Symptoms are suggestive of chronic noninfectious/inflammatory prostatitis.  Plan: - UA UCx today - Anti-inflammatory agents (NSAIDs) as needed for symptoms -Trial of oxybutynin ER 10 mg daily - The side effects of anticholinergic medications were discussed in detail. No history of narrow-angle glaucoma. Potential side effects of these medications include but are not limited to dry mouth, constipation, blurred vision, dizziness, urinary retention, and neurocognitive issues.  -Counseling on dietary changes -avoid urinary irritants such as caffeine, spicy foods, alcohol, tobacco products -Referral placed to PFPT in case of sub-satisfactory symptom improvement -RTC 1 month for symptom check.

## 2023-09-08 NOTE — PHYSICAL EXAM
[General Appearance - Well Developed] : well developed [General Appearance - Well Nourished] : well nourished [Normal Appearance] : normal appearance [Well Groomed] : well groomed [General Appearance - In No Acute Distress] : no acute distress [] : no respiratory distress [Edema] : no peripheral edema [Respiration, Rhythm And Depth] : normal respiratory rhythm and effort [Exaggerated Use Of Accessory Muscles For Inspiration] : no accessory muscle use [Oriented To Time, Place, And Person] : oriented to person, place, and time [Affect] : the affect was normal [Mood] : the mood was normal [Not Anxious] : not anxious [Normal Station and Gait] : the gait and station were normal for the patient's age [No Focal Deficits] : no focal deficits

## 2023-09-13 LAB
APPEARANCE: CLEAR
BACTERIA UR CULT: NORMAL
BACTERIA: NEGATIVE /HPF
BILIRUBIN URINE: NEGATIVE
BLOOD URINE: NEGATIVE
CAST: 0 /LPF
COLOR: YELLOW
EPITHELIAL CELLS: 0 /HPF
GLUCOSE QUALITATIVE U: NEGATIVE MG/DL
KETONES URINE: NEGATIVE MG/DL
LEUKOCYTE ESTERASE URINE: NEGATIVE
MICROSCOPIC-UA: NORMAL
NITRITE URINE: NEGATIVE
PH URINE: 7.5
PROTEIN URINE: NEGATIVE MG/DL
RED BLOOD CELLS URINE: 3 /HPF
SPECIFIC GRAVITY URINE: 1.02
UROBILINOGEN URINE: 0.2 MG/DL
WHITE BLOOD CELLS URINE: 0 /HPF

## 2023-09-18 ENCOUNTER — APPOINTMENT (OUTPATIENT)
Dept: ORTHOPEDIC SURGERY | Facility: CLINIC | Age: 28
End: 2023-09-18
Payer: MEDICAID

## 2023-09-18 DIAGNOSIS — M19.019 PRIMARY OSTEOARTHRITIS, UNSPECIFIED SHOULDER: ICD-10-CM

## 2023-09-18 DIAGNOSIS — M75.41 IMPINGEMENT SYNDROME OF RIGHT SHOULDER: ICD-10-CM

## 2023-09-18 DIAGNOSIS — M23.8X2 OTHER INTERNAL DERANGEMENTS OF LEFT KNEE: ICD-10-CM

## 2023-09-18 PROCEDURE — 73564 X-RAY EXAM KNEE 4 OR MORE: CPT | Mod: LT,RT

## 2023-09-18 PROCEDURE — 99214 OFFICE O/P EST MOD 30 MIN: CPT | Mod: 25
